# Patient Record
Sex: MALE | Race: WHITE | Employment: UNEMPLOYED | URBAN - METROPOLITAN AREA
[De-identification: names, ages, dates, MRNs, and addresses within clinical notes are randomized per-mention and may not be internally consistent; named-entity substitution may affect disease eponyms.]

---

## 2018-02-05 ENCOUNTER — HOSPITAL ENCOUNTER (EMERGENCY)
Facility: HOSPITAL | Age: 48
Discharge: HOME | End: 2018-02-05
Attending: EMERGENCY MEDICINE

## 2018-02-05 VITALS
SYSTOLIC BLOOD PRESSURE: 126 MMHG | DIASTOLIC BLOOD PRESSURE: 81 MMHG | BODY MASS INDEX: 22.4 KG/M2 | OXYGEN SATURATION: 100 % | WEIGHT: 160 LBS | RESPIRATION RATE: 16 BRPM | HEIGHT: 71 IN | TEMPERATURE: 98 F | HEART RATE: 98 BPM

## 2018-02-05 DIAGNOSIS — Z00.8 EVALUATION BY PSYCHIATRIC SERVICE REQUIRED: Primary | ICD-10-CM

## 2018-02-05 PROCEDURE — 99284 EMERGENCY DEPT VISIT MOD MDM: CPT

## 2018-02-05 NOTE — BH LEVEL OF CARE ASSESSMENT
Level of Care Assessment Note    General Questions  Why are you here?: \"I have a vavle leaking in my arm and I am pretty sure I need a procedure done on the arm. ..maybe an amputation. \"  Precipitating Events: An old blood withdrawal caused the leaking.  It No  Past Suicide Attempt: No  Past Suicide Risk Mitigating Factors: pt denied sI  Past Suicide Risk Collateral Provided By[de-identified] Kristopher Kappa  Describe Past Suicide Risk Collateral: See above     Danger to Others/Property  Current/Recent Harm Toward Others: No  Past Seclusion/Restraint: No    Alcohol Use  How often do you have a drink containing alcohol? : Never       Illicit and Prescription Drug Use  Which if any illicit/prescription drugs have you used/abused?: Denies place;Oriented to time;Disoriented to situation  Insight: Poor  Fair/poor insight as evidenced by: Pt believes he has fluid leaking in his arm and amputation is the solution  Judgment: Fair  Fair/poor judgment as evidenced by: Pt asked to be brought to the worsens; Advised to call with questions  Transferred: No    Primary Psychiatric Diagnosis  Schizophrenia Spectrum and Other Psychotic Disorders: Delusional Disorder - Somatic Type

## 2018-02-05 NOTE — ED PROVIDER NOTES
Patient Seen in: [facility] AND [facility] Emergency Department    History   No chief complaint on file. Stated Complaint: Psych    HPI    50 yo male brought to ED by his friend.  Friend has only known him for two weeks but says that today he asked to come to rebound and no guarding. Musculoskeletal: Normal range of motion. He exhibits no edema or tenderness. Some scarring to the left arm but otherwise normal. No motor or sensory deficit. No signs of infection. No weeping.     Lymphadenopathy:     He has no

## (undated) NOTE — ED AVS SNAPSHOT
Sherrell Records   MRN: [id number]    Department:  [facility] Emergency Department   Date of Visit:  2/5/2018           Disclosure     Insurance plans vary and the physician(s) referred by the ER may not be covered by your plan.  Please contact your in within the next three months to obtain basic health screening including reassessment of your blood pressure.     IF THERE IS ANY CHANGE OR WORSENING OF YOUR CONDITION, CALL YOUR PRIMARY CARE PHYSICIAN AT ONCE OR RETURN IMMEDIATELY TO THE EMERGENCY DEPARTMEN